# Patient Record
Sex: MALE | Race: OTHER | Employment: UNEMPLOYED | URBAN - METROPOLITAN AREA
[De-identification: names, ages, dates, MRNs, and addresses within clinical notes are randomized per-mention and may not be internally consistent; named-entity substitution may affect disease eponyms.]

---

## 2017-09-08 RX ORDER — PHENOLPHTHALEIN 90 MG
10 TABLET,CHEWABLE ORAL DAILY
COMMUNITY

## 2017-09-08 RX ORDER — GABAPENTIN 250 MG/5ML
250 SOLUTION ORAL 2 TIMES DAILY
COMMUNITY

## 2017-09-11 ENCOUNTER — ANESTHESIA (OUTPATIENT)
Dept: SURGERY | Age: 22
End: 2017-09-11
Payer: MEDICAID

## 2017-09-11 ENCOUNTER — HOSPITAL ENCOUNTER (OUTPATIENT)
Age: 22
Setting detail: OUTPATIENT SURGERY
Discharge: HOME | End: 2017-09-11
Attending: SURGERY | Admitting: SURGERY
Payer: MEDICAID

## 2017-09-11 ENCOUNTER — ANESTHESIA EVENT (OUTPATIENT)
Dept: SURGERY | Age: 22
End: 2017-09-11
Payer: MEDICAID

## 2017-09-11 VITALS
WEIGHT: 84.22 LBS | OXYGEN SATURATION: 99 % | TEMPERATURE: 97.5 F | HEART RATE: 109 BPM | HEIGHT: 60 IN | RESPIRATION RATE: 17 BRPM | SYSTOLIC BLOOD PRESSURE: 106 MMHG | DIASTOLIC BLOOD PRESSURE: 64 MMHG | BODY MASS INDEX: 16.53 KG/M2

## 2017-09-11 PROCEDURE — B4088 GASTRO/JEJUNO TUBE, LOW-PRO: HCPCS | Performed by: SURGERY

## 2017-09-11 PROCEDURE — 77030020782 HC GWN BAIR PAWS FLX 3M -B

## 2017-09-11 PROCEDURE — 77030005537 HC CATH URETH BARD -A: Performed by: SURGERY

## 2017-09-11 PROCEDURE — 77030011283 HC ELECTRD NDL COVD -A: Performed by: SURGERY

## 2017-09-11 PROCEDURE — 76210000020 HC REC RM PH II FIRST 0.5 HR: Performed by: SURGERY

## 2017-09-11 PROCEDURE — 74011250636 HC RX REV CODE- 250/636

## 2017-09-11 PROCEDURE — 77030008598 HC TRCR ENDOSC BLDLS J&J -B: Performed by: SURGERY

## 2017-09-11 PROCEDURE — 77030035048 HC TRCR ENDOSC OPTCL COVD -B: Performed by: SURGERY

## 2017-09-11 PROCEDURE — 77030010507 HC ADH SKN DERMBND J&J -B: Performed by: SURGERY

## 2017-09-11 PROCEDURE — 76210000063 HC OR PH I REC FIRST 0.5 HR: Performed by: SURGERY

## 2017-09-11 PROCEDURE — 77030031139 HC SUT VCRL2 J&J -A: Performed by: SURGERY

## 2017-09-11 PROCEDURE — 77030011640 HC PAD GRND REM COVD -A: Performed by: SURGERY

## 2017-09-11 PROCEDURE — 74011000250 HC RX REV CODE- 250: Performed by: SURGERY

## 2017-09-11 PROCEDURE — 76060000034 HC ANESTHESIA 1.5 TO 2 HR: Performed by: SURGERY

## 2017-09-11 PROCEDURE — 76010000153 HC OR TIME 1.5 TO 2 HR: Performed by: SURGERY

## 2017-09-11 PROCEDURE — 77030013079 HC BLNKT BAIR HGGR 3M -A: Performed by: ANESTHESIOLOGY

## 2017-09-11 PROCEDURE — 74011000250 HC RX REV CODE- 250

## 2017-09-11 PROCEDURE — 77030008684 HC TU ET CUF COVD -B: Performed by: ANESTHESIOLOGY

## 2017-09-11 PROCEDURE — 77030002933 HC SUT MCRYL J&J -A: Performed by: SURGERY

## 2017-09-11 PROCEDURE — 77030018836 HC SOL IRR NACL ICUM -A: Performed by: SURGERY

## 2017-09-11 PROCEDURE — 77030020747 HC TU INSUF ENDOSC TELE -A: Performed by: SURGERY

## 2017-09-11 PROCEDURE — 77030008477 HC STYL SATN SLP COVD -A: Performed by: ANESTHESIOLOGY

## 2017-09-11 RX ORDER — CEFAZOLIN SODIUM 1 G/3ML
INJECTION, POWDER, FOR SOLUTION INTRAMUSCULAR; INTRAVENOUS AS NEEDED
Status: DISCONTINUED | OUTPATIENT
Start: 2017-09-11 | End: 2017-09-11 | Stop reason: HOSPADM

## 2017-09-11 RX ORDER — SODIUM CHLORIDE, SODIUM LACTATE, POTASSIUM CHLORIDE, CALCIUM CHLORIDE 600; 310; 30; 20 MG/100ML; MG/100ML; MG/100ML; MG/100ML
INJECTION, SOLUTION INTRAVENOUS
Status: DISCONTINUED | OUTPATIENT
Start: 2017-09-11 | End: 2017-09-11 | Stop reason: HOSPADM

## 2017-09-11 RX ORDER — KETOROLAC TROMETHAMINE 30 MG/ML
INJECTION, SOLUTION INTRAMUSCULAR; INTRAVENOUS AS NEEDED
Status: DISCONTINUED | OUTPATIENT
Start: 2017-09-11 | End: 2017-09-11 | Stop reason: HOSPADM

## 2017-09-11 RX ORDER — ESMOLOL HYDROCHLORIDE 10 MG/ML
INJECTION INTRAVENOUS AS NEEDED
Status: DISCONTINUED | OUTPATIENT
Start: 2017-09-11 | End: 2017-09-11 | Stop reason: HOSPADM

## 2017-09-11 RX ORDER — GLYCOPYRROLATE 0.2 MG/ML
INJECTION INTRAMUSCULAR; INTRAVENOUS AS NEEDED
Status: DISCONTINUED | OUTPATIENT
Start: 2017-09-11 | End: 2017-09-11 | Stop reason: HOSPADM

## 2017-09-11 RX ORDER — LIDOCAINE HYDROCHLORIDE 20 MG/ML
INJECTION, SOLUTION EPIDURAL; INFILTRATION; INTRACAUDAL; PERINEURAL AS NEEDED
Status: DISCONTINUED | OUTPATIENT
Start: 2017-09-11 | End: 2017-09-11 | Stop reason: HOSPADM

## 2017-09-11 RX ORDER — ROCURONIUM BROMIDE 10 MG/ML
INJECTION, SOLUTION INTRAVENOUS AS NEEDED
Status: DISCONTINUED | OUTPATIENT
Start: 2017-09-11 | End: 2017-09-11 | Stop reason: HOSPADM

## 2017-09-11 RX ORDER — BUPIVACAINE HYDROCHLORIDE 2.5 MG/ML
INJECTION, SOLUTION EPIDURAL; INFILTRATION; INTRACAUDAL AS NEEDED
Status: DISCONTINUED | OUTPATIENT
Start: 2017-09-11 | End: 2017-09-11 | Stop reason: HOSPADM

## 2017-09-11 RX ORDER — PHENYLEPHRINE HCL IN 0.9% NACL 0.4MG/10ML
SYRINGE (ML) INTRAVENOUS AS NEEDED
Status: DISCONTINUED | OUTPATIENT
Start: 2017-09-11 | End: 2017-09-11 | Stop reason: HOSPADM

## 2017-09-11 RX ORDER — ACETAMINOPHEN 10 MG/ML
INJECTION, SOLUTION INTRAVENOUS AS NEEDED
Status: DISCONTINUED | OUTPATIENT
Start: 2017-09-11 | End: 2017-09-11 | Stop reason: HOSPADM

## 2017-09-11 RX ORDER — PROPOFOL 10 MG/ML
INJECTION, EMULSION INTRAVENOUS AS NEEDED
Status: DISCONTINUED | OUTPATIENT
Start: 2017-09-11 | End: 2017-09-11 | Stop reason: HOSPADM

## 2017-09-11 RX ORDER — ONDANSETRON 2 MG/ML
INJECTION INTRAMUSCULAR; INTRAVENOUS AS NEEDED
Status: DISCONTINUED | OUTPATIENT
Start: 2017-09-11 | End: 2017-09-11 | Stop reason: HOSPADM

## 2017-09-11 RX ORDER — FENTANYL CITRATE 50 UG/ML
INJECTION, SOLUTION INTRAMUSCULAR; INTRAVENOUS AS NEEDED
Status: DISCONTINUED | OUTPATIENT
Start: 2017-09-11 | End: 2017-09-11 | Stop reason: HOSPADM

## 2017-09-11 RX ORDER — WATER FOR INJECTION,STERILE
VIAL (ML) INJECTION AS NEEDED
Status: DISCONTINUED | OUTPATIENT
Start: 2017-09-11 | End: 2017-09-11 | Stop reason: HOSPADM

## 2017-09-11 RX ADMIN — ROCURONIUM BROMIDE 30 MG: 10 INJECTION, SOLUTION INTRAVENOUS at 11:07

## 2017-09-11 RX ADMIN — GLYCOPYRROLATE 0.2 MG: 0.2 INJECTION INTRAMUSCULAR; INTRAVENOUS at 11:48

## 2017-09-11 RX ADMIN — ACETAMINOPHEN 573 MG: 10 INJECTION, SOLUTION INTRAVENOUS at 11:43

## 2017-09-11 RX ADMIN — GLYCOPYRROLATE 0.2 MG: 0.2 INJECTION INTRAMUSCULAR; INTRAVENOUS at 11:53

## 2017-09-11 RX ADMIN — LIDOCAINE HYDROCHLORIDE 40 MG: 20 INJECTION, SOLUTION EPIDURAL; INFILTRATION; INTRACAUDAL; PERINEURAL at 11:07

## 2017-09-11 RX ADMIN — ONDANSETRON 4 MG: 2 INJECTION INTRAMUSCULAR; INTRAVENOUS at 12:18

## 2017-09-11 RX ADMIN — ESMOLOL HYDROCHLORIDE 10 MG: 10 INJECTION INTRAVENOUS at 12:01

## 2017-09-11 RX ADMIN — KETOROLAC TROMETHAMINE 15 MG: 30 INJECTION, SOLUTION INTRAMUSCULAR; INTRAVENOUS at 12:24

## 2017-09-11 RX ADMIN — FENTANYL CITRATE 25 MCG: 50 INJECTION, SOLUTION INTRAMUSCULAR; INTRAVENOUS at 11:56

## 2017-09-11 RX ADMIN — ESMOLOL HYDROCHLORIDE 15 MG: 10 INJECTION INTRAVENOUS at 12:03

## 2017-09-11 RX ADMIN — CEFAZOLIN SODIUM 0.95 G: 1 INJECTION, POWDER, FOR SOLUTION INTRAMUSCULAR; INTRAVENOUS at 11:31

## 2017-09-11 RX ADMIN — PROPOFOL 60 MG: 10 INJECTION, EMULSION INTRAVENOUS at 11:07

## 2017-09-11 RX ADMIN — Medication 80 MCG: at 11:48

## 2017-09-11 RX ADMIN — SODIUM CHLORIDE, SODIUM LACTATE, POTASSIUM CHLORIDE, CALCIUM CHLORIDE: 600; 310; 30; 20 INJECTION, SOLUTION INTRAVENOUS at 11:00

## 2017-09-11 RX ADMIN — ROCURONIUM BROMIDE 10 MG: 10 INJECTION, SOLUTION INTRAVENOUS at 11:48

## 2017-09-11 RX ADMIN — FENTANYL CITRATE 25 MCG: 50 INJECTION, SOLUTION INTRAMUSCULAR; INTRAVENOUS at 11:41

## 2017-09-11 RX ADMIN — ESMOLOL HYDROCHLORIDE 10 MG: 10 INJECTION INTRAVENOUS at 11:58

## 2017-09-11 NOTE — ANESTHESIA POSTPROCEDURE EVALUATION
Post-Anesthesia Evaluation and Assessment    Patient: Zain Lo MRN: [id number]  SSN: [id number]    YOB: 1995  Age: 22 y.o. Sex: male       Cardiovascular Function/Vital Signs  Visit Vitals    /72 (BP 1 Location: Right leg, BP Patient Position: At rest)    Pulse (!) 115    Temp 36.4 °C (97.5 °F)    Resp 21    Ht 4' 5\" (1.346 m)    Wt 38.2 kg (84 lb 3.5 oz)    SpO2 94%    BMI 21.08 kg/m2       Patient is status post general anesthesia for Procedure(s):  DIAGNOSTIC  LAPAROSCOPY REPLACEMENT OF GASTROSTOMY TUBE. Nausea/Vomiting: None    Postoperative hydration reviewed and adequate. Pain:  Pain Scale 1: FACES (09/11/17 1245)   Managed    Neurological Status:   Neuro (WDL): Exceptions to WDL (09/11/17 1245)  Neuro  Neurologic State: Drowsy (09/11/17 1245)   At baseline    Mental Status and Level of Consciousness: Arousable    Pulmonary Status:   O2 Device: Room air (09/11/17 1245)   Adequate oxygenation and airway patent    Complications related to anesthesia: None    Post-anesthesia assessment completed.  No concerns    Signed By: Chad Castillo MD     September 11, 2017

## 2017-09-11 NOTE — ANESTHESIA PREPROCEDURE EVALUATION
Anesthetic History   No history of anesthetic complications            Review of Systems / Medical History  Patient summary reviewed, nursing notes reviewed and pertinent labs reviewed    Pulmonary  Within defined limits                 Neuro/Psych   Within defined limits  seizures         Cardiovascular  Within defined limits                     GI/Hepatic/Renal  Within defined limits   GERD           Endo/Other  Within defined limits      Arthritis     Other Findings   Comments: CP           Physical Exam    Airway  Mallampati: II  TM Distance: > 6 cm  Neck ROM: normal range of motion   Mouth opening: Normal     Cardiovascular  Regular rate and rhythm,  S1 and S2 normal,  no murmur, click, rub, or gallop             Dental  No notable dental hx       Pulmonary  Breath sounds clear to auscultation               Abdominal  GI exam deferred       Other Findings            Anesthetic Plan    ASA: 3  Anesthesia type: general          Induction: Intravenous and Inhalational  Anesthetic plan and risks discussed with: Patient and Parent / [address]

## 2017-09-11 NOTE — DISCHARGE INSTRUCTIONS
Laparoscopy: What to Expect at [address] [place] [address]  After laparoscopic surgery, you are likely to have pain for the next several days. You may also feel like you have the flu. You may have a low fever and feel tired and sick to your stomach. This is common. You should feel better after 1 to 2 weeks. This care sheet gives you a general idea about how long it will take for you to recover. But each person recovers at a different pace. Follow the steps below to get better as quickly as possible. How can you care for yourself at home? Activity  · You may also have pain in your shoulder. The pain usually lasts about 1 or 2 days. · You may shower/bathe 24 to 48 hours after surgery, if your doctor okays it. Pat the cut (incision) dry. Do not take a bath for the first 2 weeks, or until your doctor tells you it is okay. Diet  · If your stomach is upset, try bland, low-fat foods such as plain rice, broiled chicken, toast, and yogurt. · Drink plenty of fluids (enough so that your urine is light yellow or clear like water) to prevent dehydration. Choose water and other caffeine-free clear liquids. If you have kidney, heart, or liver disease and have to limit fluids, talk with your doctor before you increase the amount of fluids you drink. · You may notice that your bowel movements are not regular right after your surgery. This is common. Avoid constipation and straining with bowel movements. You may want to take a fiber supplement every day. If you have not had a bowel movement after a couple of days, ask your doctor about taking a mild laxative. Medicines  · Your doctor will tell you if and when you can restart your medicines. He or she will also give you instructions about taking any new medicines. · If you take blood thinners, such as warfarin (Coumadin), clopidogrel (Plavix), or aspirin, be sure to talk to your doctor. He or she will tell you if and when to start taking those medicines again.  Make sure that you understand exactly what your doctor wants you to do. · Take pain medicines exactly as directed. ¨ If the doctor gave you a prescription medicine for pain, take it as prescribed. ¨ If you are not taking a prescription pain medicine, ask your doctor if you can take an over-the-counter medicine. · If your doctor prescribed antibiotics, take them as directed. Do not stop taking them just because you feel better. You need to take the full course of antibiotics. · If you think your pain medicine is making you sick to your stomach:  ¨ Take your medicine after meals (unless your doctor has told you not to). ¨ Ask your doctor for a different pain medicine. Incision care  · You have surgical glue on the incision, leave this on until it falls off. · Wash the area daily with warm, soapy water and pat it dry. Don't use hydrogen peroxide or alcohol, which can slow healing. You may cover the area with a gauze bandage if it weeps or rubs against clothing. Change the bandage every day. Follow-up care is a key part of your treatment and safety. Be sure to make and go to all appointments, and call your doctor if you are having problems. It's also a good idea to know your test results and keep a list of the medicines you take. When should you call for help? Call 911 anytime you think you may need emergency care. For example, call if:  · You passed out (lost consciousness). · You have sudden chest pain and shortness of breath, or you cough up blood. · You have severe pain in your belly. Call your doctor now or seek immediate medical care if:  · You have pain that does not get better after you take pain medicine. · You have loose stitches, or your incision comes open. · You are bleeding or have new drainage from the incision. · You develop a hard lump at the incision. · You have signs of infection, such as:  ¨ Increased pain, swelling, warmth, or redness. ¨ Red streaks leading from the incision.   ¨ Pus draining from the incision. ¨ A fever. Watch closely for any changes in your health, and be sure to contact your doctor if:  · You do not have a bowel movement after taking a laxative. · You do not get better as expected. Where can you learn more? Go to http://josé antonio-katie.info/. Enter G997 in the search box to learn more about \"Laparoscopy: What to Expect at Home. \"  Current as of: August 9, 2016  Content Version: 11.3  © 8518-8473 [employer]. Care instructions adapted under license by [employer] (which disclaims liability or warranty for this information). If you have questions about a medical condition or this instruction, always ask your healthcare professional. [address] any warranty or liability for your use of this information. ______________________________________________________________________    Anesthesia Discharge Instructions    After general anesthesia or intervenous sedation, for 24 hours or while taking prescription Narcotics:  · Limit your activities  · Do not drive or operate hazardous machinery  · If you have not urinated within 8 hours after discharge, please contact your surgeon on call. · Do not make important personal or business decisions  · Do not drink alcoholic beverages    Report the following to your surgeon:  · Excessive pain, swelling, redness or odor of or around the surgical area  · Temperature over 100.5 degrees  · Nausea and vomiting lasting longer than 4 hours or if unable to take medication  · Any signs of decreased circulation or nerve impairment to extremity:  Change in color, persistent numbness, tingling, coldness or increased pain.   · Any questions

## 2017-09-11 NOTE — OP NOTES
[address] [facility] [address] [place] [address], [address]   OP NOTE       Name:  Aparna Dallas   MR#:  [id number]   :  1995   Account #:  [de-identified]    Surgery Date:  2017   Date of Adm:  2017       PREOPERATIVE DIAGNOSIS:  Malfunction of gastrostomy tube. POSTOPERATIVE DIAGNOSIS:  Malfunction of gastrostomy tube. PROCEDURES PERFORMED:  Exploratory laparoscopy and   replacement of gastrostomy tube. SURGEON: Aston Adler MD    ASSISTANT: Plant     ANESTHESIA:  General    HISTORY: This is a 27-year-old born with cerebral palsy, requiring   gastrostomy and Nissen, and now having difficulty in replacing the tube   of his gastrostomy tube. He is being taken to the operating room for   exploratory laparoscopy and possible replacement or repositioning of   his gastrostomy tube. DESCRIPTION OF PROCEDURE: The patient was in the supine   position, general anesthesia with endotracheal intubation. The   abdomen was prepped with Betadine soap solution. Sterile drapes   were placed. We approached with a 5 Covidien port at the umbilicus   and exam of the abdominal cavity showed multiple adhesions,   probably secondary to his  shunt and a very large distended colon. We placed a second port in the right upper quadrant and slowly, we   could identify where the stomach was and it showed to be  in a   good position with no L type shape angle. During the procedure, he dropped  his blood pressure slightly that responded by decreasing the insuflation pressure. We went ahead and passed a Amezcua easily went into the stomach without   any difficulty, so we opted then to try a new mini gastrostomy tube. We   used the [address] 4 cm   and after dilating with Hegar, we were able to easily pass into the stomach. Balloon was inflated with 5 ml of water. The wounds where closed with 5-0 Monocryl and Dermabond glue. The   wound was infiltrated with Marcaine 0.25% plain.  Instrument, gauze and needle counts were correct at the end of the case. BLOOD LOSS/BLEEDING: Minimal.     SPECIMEN: None. COMPLICATIONS: None.         Elijah Hernández MD      CO / [facility]   D:  09/11/2017   12:29   T:  09/11/2017   13:28   Job #:  [id number]

## 2017-09-11 NOTE — BRIEF OP NOTE
BRIEF OPERATIVE NOTE    Date of Procedure: 9/11/2017   Preoperative Diagnosis: FEEDING DIFFICULTIES  Postoperative Diagnosis: FEEDING DIFFICULTIES    Procedure(s):  DIAGNOSTIC  LAPAROSCOPY REPLACEMENT OF GASTROSTOMY TUBE  Surgeon(s) and Role:     * Cynthia George MD - Primary         Assistant Staff:       Surgical Staff:  Circ-1: Catrachito Hodge RN  Circ-Relief: Claudell Gazella, RN  Scrub RN-1: Estella Bains RN  Event Time In   Incision Start 1140   Incision Close      Anesthesia: General   Estimated Blood Loss: minimal  Specimens: * No specimens in log *   Findings: Large amount of adhesions, very large distended colon, easy passage of GT tube   Complications: none  Implants: * No implants in log *

## 2017-09-11 NOTE — IP AVS SNAPSHOT
2700 [facility] [address] [place] [address] 
[phone] Patient: Ketty Hall MRN: [id number] :1995 You are allergic to the following No active allergies Recent Documentation Height Weight BMI Smoking Status 1.346 m 38.2 kg 21.08 kg/m2 Never Smoker Emergency Contacts Name Discharge Info Relation Home Work Mobile Kalpana Membreno CAREGIVER [3] Mother [14] [phone] About your hospitalization You were admitted on:  2017 [facility] on:  2017 Unit phone number:  [phone] Why you were hospitalized Your primary diagnosis was:  Not on File Providers Seen During Your Hospitalizations Provider Role Specialty Primary office phone Brooke Kong MD Attending Provider Pediatric Surgery [phone] Your Primary Care Physician (PCP) Primary Care Physician Office Phone Office Fax Graciela [place] [phone] Follow-up Information Follow up With Details Comments Contact Info [facility]a Thompson MD   [facility] 
[phone] Brooke Kong MD Follow up in 1 month(s) call the office to schedule a follow up visit [id number] [place] [address] 
[phone] Current Discharge Medication List  
  
CONTINUE these medications which have CHANGED Dose & Instructions Dispensing Information Comments Morning Noon Evening Bedtime * raNITIdine 15 mg/mL syrup Commonly known as:  ZANTAC What changed:  Another medication with the same name was changed. Make sure you understand how and when to take each. Your last dose was: Your next dose is: TAKE 5 ML BY MOUTH TWO (2) TIMES A DAY FOR 30 DAYS AS NEEDED FOR REFLUX,COUGH Quantity:  300 mL Refills:  5 * raNITIdine 15 mg/mL syrup Commonly known as:  ZANTAC What changed:  See the new instructions. Your last dose was: Your next dose is: TAKE 5 ML BY MOUTH TWO (2) TIMES A DAY FOR 30 DAYS AS NEEDED FOR REFLUX,COUGH Quantity:  300 mL Refills:  5  
     
   
   
   
  
 * Notice: This list has 2 medication(s) that are the same as other medications prescribed for you. Read the directions carefully, and ask your doctor or other care provider to review them with you. CONTINUE these medications which have NOT CHANGED Dose & Instructions Dispensing Information Comments Morning Noon Evening Bedtime  
 gabapentin 250 mg/5 mL solution Commonly known as:  NEURONTIN Your last dose was: Your next dose is:    
   
   
 Dose:  250 mg Take 250 mg by mouth two (2) times a day. Refills:  0 GENTLE LAXATIVE 10 mg suppository Generic drug:  bisacodyl Your last dose was: Your next dose is: UNWRAP AND INSERT 1 SUPPOSITORY INTO RECTUM EVERY OTHER DAY. Quantity:  15 Suppository Refills:  6  
     
   
   
   
  
 loratadine 5 mg/5 mL syrup Commonly known as:  Jesse Pour Your last dose was: Your next dose is:    
   
   
 Dose:  10 mg Take 10 mg by mouth daily. Refills:  0  
     
   
   
   
  
 mupirocin 2 % ointment Commonly known as:  [facility] Your last dose was: Your next dose is:    
   
   
 Apply  to affected area two (2) times a day. Apply for 3 weeks after cleansing gastrostomy site with soap and water Quantity:  22 g Refills:  1  
     
   
   
   
  
 valproic acid (as sodium salt) 250 mg/5 mL Syrg Your last dose was: Your next dose is:    
   
   
 Dose:  8 mL Take 8 mL by mouth two (2) times a day. Refills:  0 Discharge Instructions Laparoscopy: What to Expect at [facility] Your Recovery After laparoscopic surgery, you are likely to have pain for the next several days. You may also feel like you have the flu. You may have a low fever and feel tired and sick to your stomach. This is common. You should feel better after 1 to 2 weeks. This care sheet gives you a general idea about how long it will take for you to recover. But each person recovers at a different pace. Follow the steps below to get better as quickly as possible. How can you care for yourself at home? Activity · You may also have pain in your shoulder. The pain usually lasts about 1 or 2 days. · You may shower/bathe 24 to 48 hours after surgery, if your doctor okays it. Pat the cut (incision) dry. Do not take a bath for the first 2 weeks, or until your doctor tells you it is okay. Diet · If your stomach is upset, try bland, low-fat foods such as plain rice, broiled chicken, toast, and yogurt. · Drink plenty of fluids (enough so that your urine is light yellow or clear like water) to prevent dehydration. Choose water and other caffeine-free clear liquids. If you have kidney, heart, or liver disease and have to limit fluids, talk with your doctor before you increase the amount of fluids you drink. · You may notice that your bowel movements are not regular right after your surgery. This is common. Avoid constipation and straining with bowel movements. You may want to take a fiber supplement every day. If you have not had a bowel movement after a couple of days, ask your doctor about taking a mild laxative. Medicines · Your doctor will tell you if and when you can restart your medicines. He or she will also give you instructions about taking any new medicines. · If you take blood thinners, such as warfarin (Coumadin), clopidogrel (Plavix), or aspirin, be sure to talk to your doctor. He or she will tell you if and when to start taking those medicines again. Make sure that you understand exactly what your doctor wants you to do. · Take pain medicines exactly as directed. ¨ If the doctor gave you a prescription medicine for pain, take it as prescribed. ¨ If you are not taking a prescription pain medicine, ask your doctor if you can take an over-the-counter medicine. · If your doctor prescribed antibiotics, take them as directed. Do not stop taking them just because you feel better. You need to take the full course of antibiotics. · If you think your pain medicine is making you sick to your stomach: 
¨ Take your medicine after meals (unless your doctor has told you not to). ¨ Ask your doctor for a different pain medicine. Incision care · You have surgical glue on the incision, leave this on until it falls off. · Wash the area daily with warm, soapy water and pat it dry. Don't use hydrogen peroxide or alcohol, which can slow healing. You may cover the area with a gauze bandage if it weeps or rubs against clothing. Change the bandage every day. Follow-up care is a key part of your treatment and safety. Be sure to make and go to all appointments, and call your doctor if you are having problems. It's also a good idea to know your test results and keep a list of the medicines you take. When should you call for help? Call 911 anytime you think you may need emergency care. For example, call if: 
· You passed out (lost consciousness). · You have sudden chest pain and shortness of breath, or you cough up blood. · You have severe pain in your belly. Call your doctor now or seek immediate medical care if: 
· You have pain that does not get better after you take pain medicine. · You have loose stitches, or your incision comes open. · You are bleeding or have new drainage from the incision. · You develop a hard lump at the incision. · You have signs of infection, such as: 
¨ Increased pain, swelling, warmth, or redness. ¨ Red streaks leading from the incision. ¨ Pus draining from the incision. ¨ A fever. Watch closely for any changes in your health, and be sure to contact your doctor if: 
· You do not have a bowel movement after taking a laxative. · You do not get better as expected. Where can you learn more? Go to http://josé antonio-katie.info/. Enter H954 in the search box to learn more about \"Laparoscopy: What to Expect at Home. \" Current as of: August 9, 2016 Content Version: 11.3 © 9742-5714 [employer]. Care instructions adapted under license by [employer] (which disclaims liability or warranty for this information). If you have questions about a medical condition or this instruction, always ask your healthcare professional. [address] any warranty or liability for your use of this information. ______________________________________________________________________ Anesthesia Discharge Instructions After general anesthesia or intervenous sedation, for 24 hours or while taking prescription Narcotics: · Limit your activities · Do not drive or operate hazardous machinery · If you have not urinated within 8 hours after discharge, please contact your surgeon on call. · Do not make important personal or business decisions · Do not drink alcoholic beverages Report the following to your surgeon: 
· Excessive pain, swelling, redness or odor of or around the surgical area · Temperature over 100.5 degrees · Nausea and vomiting lasting longer than 4 hours or if unable to take medication · Any signs of decreased circulation or nerve impairment to extremity:  Change in color, persistent numbness, tingling, coldness or increased pain. · Any questions Discharge Orders None Introducing [facility]! Francesca Pina introduces vIPtela patient portal. Now you can access parts of your medical record, email your doctor's office, and request medication refills online.    
 
1. In your internet browser, go to [email or web address] 2. Click on the First Time User? Click Here link in the Sign In box. You will see the New Member Sign Up page. 3. Enter your FirstRide Access Code exactly as it appears below. You will not need to use this code after youve completed the sign-up process. If you do not sign up before the expiration date, you must request a new code. · FirstRide Access Code: [email or web address] Expires: 12/7/2017  4:44 PM 
 
4. Enter the last four digits of your Social Security Number (xxxx) and Date of Birth (mm/dd/yyyy) as indicated and click Submit. You will be taken to the next sign-up page. 5. Create a FirstRide ID. This will be your FirstRide login ID and cannot be changed, so think of one that is secure and easy to remember. 6. Create a FirstRide password. You can change your password at any time. 7. Enter your Password Reset Question and Answer. This can be used at a later time if you forget your password. 8. Enter your e-mail address. You will receive e-mail notification when new information is available in [address] 9. Click Sign Up. You can now view and download portions of your medical record. 10. Click the Download Summary menu link to download a portable copy of your medical information. If you have questions, please visit the Frequently Asked Questions section of the FirstRide website. Remember, FirstRide is NOT to be used for urgent needs. For medical emergencies, dial 911. Now available from your iPhone and Android! General Information Please provide this summary of care documentation to your next provider. Patient Signature:  ____________________________________________________________ Date:  ____________________________________________________________  
  
Larri Hazard Provider Signature:  ____________________________________________________________ Date:  ____________________________________________________________ 2700 [facility] [address] [place] [address] 
[phone] Patient: Merline Cords MRN: [id number] :1995 Tiene alergias a lo siguiente No tiene alergias Documentación recientes Height Weight BMI (IMC) Estatus de tabaquísmo 1.346 m 38.2 kg 21.08 kg/m2 Never Smoker Emergency Contacts Name Discharge Info Relation Home Work Mobile Gianni Hurt CAREGIVER [3] Mother [14] [phone] Sobre loving hospitalización Le admitieron el:  2017 Loving tratamiento más reciente [facility] [place]:  [facility] PACU Le dieron de hira el:  2017 Número de teléfono de la unidad:  [phone] Por qué le ingresaron Loving diagnosis primaria es:  No está archivado/a Proveedores de verse rosetta annette hospitalizaciones Personal Médico Rol Especialidad Teléfono principal de la oficina Rubin Leonardo MD Attending Provider Pediatric Surgery [phone] Loving médico de atención primaria (PCP ) Primary Care Physician Office Phone Office Fax Rachel Somers [phone] Follow-up Information Follow up With Details Comments Contact Info Yudith Navarro MD   [address]  
[phone] Rubin Leonardo MD Follow up in 1 month(s) call the office to schedule a follow up visit [id number] [address] [address] [place] [address] 
[phone] Aprobación de la gestión actual lista de medicamentos CONTINUAR estos medicamentos que [place] Dosis e instrucciones Información de dispensación Comentarios Morning Noon Evening Bedtime * raNITIdine 15 mg/mL syrup También conocido nima:  ZANTAC Lo que cambió:  Se modificó otro medicamento con el mismo nombre. Asegúrese de que entiende cómo y cuándo natalia cada jay. Your last dose was: Your next dose is: TAKE 5 ML BY MOUTH TWO (2) TIMES A DAY FOR 30 DAYS AS NEEDED FOR REFLUX,COUGH  
 cantidad:  300 mL  
recargas:  5  
     
   
   
   
  
 * raNITIdine 15 mg/mL syrup También conocido nima:  ZANTAC Lo que cambió:  Donaldo las nuevas instrucciones. Your last dose was: Your next dose is: TAKE 5 ML BY MOUTH TWO (2) TIMES A DAY FOR 30 DAYS AS NEEDED FOR REFLUX,COUGH  
 cantidad:  300 mL  
recargas:  5  
     
   
   
   
  
 * Aviso:  Esta lista contiene medicamentos que son iguales a otros medicamentos recetados para usted. Ludy las instrucciones con cuidado y pida a guaman personal médico que revise la lista de medicamentos y las instrucciones correspondientes con usted. CONTINUAR estos medicamentos que no [place] Dosis e instrucciones Información de dispensación Comentarios Morning Noon Evening Bedtime  
 gabapentin 250 mg/5 mL solution También conocido nima:  NEURONTIN Your last dose was: Your next dose is:    
   
   
 Dosis:  250 mg Take 250 mg by mouth two (2) times a day. recargas:  0 GENTLE LAXATIVE 10 mg suppository Medicamento genérico:  bisacodyl Your last dose was: Your next dose is: UNWRAP AND INSERT 1 SUPPOSITORY INTO RECTUM EVERY OTHER DAY. cantidad:  15 Suppository  
recargas:  6  
     
   
   
   
  
 loratadine 5 mg/5 mL syrup También conocido nima:  Rhonda Ford Your last dose was: Your next dose is:    
   
   
 Dosis:  10 mg Take 10 mg by mouth daily. recargas:  0  
     
   
   
   
  
 mupirocin 2 % ointment También conocido nima:  [facility] Your last dose was: Your next dose is:    
   
   
 Apply  to affected area two (2) times a day. Apply for 3 weeks after cleansing gastrostomy site with soap and water  
 cantidad:  22 g  
recargas:  1  
     
   
   
   
  
 valproic acid (as sodium salt) 250 mg/5 mL Syrg Your last dose was: Your next dose is:    
   
   
 Dosis:  8 mL Take 8 mL by mouth two (2) times a day. recargas:  0 Discharge Instructions Laparoscopy: What to Expect at [facility] Your Recovery After laparoscopic surgery, you are likely to have pain for the next several days. You may also feel like you have the flu. You may have a low fever and feel tired and sick to your stomach. This is common. You should feel better after 1 to 2 weeks. This care sheet gives you a general idea about how long it will take for you to recover. But each person recovers at a different pace. Follow the steps below to get better as quickly as possible. How can you care for yourself at home? Activity · You may also have pain in your shoulder. The pain usually lasts about 1 or 2 days. · You may shower/bathe 24 to 48 hours after surgery, if your doctor okays it. Pat the cut (incision) dry. Do not take a bath for the first 2 weeks, or until your doctor tells you it is okay. Diet · If your stomach is upset, try bland, low-fat foods such as plain rice, broiled chicken, toast, and yogurt. · Drink plenty of fluids (enough so that your urine is light yellow or clear like water) to prevent dehydration. Choose water and other caffeine-free clear liquids. If you have kidney, heart, or liver disease and have to limit fluids, talk with your doctor before you increase the amount of fluids you drink. · You may notice that your bowel movements are not regular right after your surgery. This is common. Avoid constipation and straining with bowel movements. You may want to take a fiber supplement every day. If you have not had a bowel movement after a couple of days, ask your doctor about taking a mild laxative. Medicines · Your doctor will tell you if and when you can restart your medicines. He or she will also give you instructions about taking any new medicines. · If you take blood thinners, such as warfarin (Coumadin), clopidogrel (Plavix), or aspirin, be sure to talk to your doctor. He or she will tell you if and when to start taking those medicines again. Make sure that you understand exactly what your doctor wants you to do. · Take pain medicines exactly as directed. ¨ If the doctor gave you a prescription medicine for pain, take it as prescribed. ¨ If you are not taking a prescription pain medicine, ask your doctor if you can take an over-the-counter medicine. · If your doctor prescribed antibiotics, take them as directed. Do not stop taking them just because you feel better. You need to take the full course of antibiotics. · If you think your pain medicine is making you sick to your stomach: 
¨ Take your medicine after meals (unless your doctor has told you not to). ¨ Ask your doctor for a different pain medicine. Incision care · You have surgical glue on the incision, leave this on until it falls off. · Wash the area daily with warm, soapy water and pat it dry. Don't use hydrogen peroxide or alcohol, which can slow healing. You may cover the area with a gauze bandage if it weeps or rubs against clothing. Change the bandage every day. Follow-up care is a key part of your treatment and safety. Be sure to make and go to all appointments, and call your doctor if you are having problems. It's also a good idea to know your test results and keep a list of the medicines you take. When should you call for help? Call 911 anytime you think you may need emergency care. For example, call if: 
· You passed out (lost consciousness). · You have sudden chest pain and shortness of breath, or you cough up blood. · You have severe pain in your belly. Call your doctor now or seek immediate medical care if: 
· You have pain that does not get better after you take pain medicine. · You have loose stitches, or your incision comes open. · You are bleeding or have new drainage from the incision. · You develop a hard lump at the incision. · You have signs of infection, such as: 
¨ Increased pain, swelling, warmth, or redness. ¨ Red streaks leading from the incision. ¨ Pus draining from the incision. ¨ A fever. Watch closely for any changes in your health, and be sure to contact your doctor if: 
· You do not have a bowel movement after taking a laxative. · You do not get better as expected. Where can you learn more? Go to http://josé antonio-katie.info/. Enter O772 in the search box to learn more about \"Laparoscopy: What to Expect at Home. \" Current as of: August 9, 2016 Content Version: 11.3 © 6679-2897 [employer]. Care instructions adapted under license by [employer] (which disclaims liability or warranty for this information). If you have questions about a medical condition or this instruction, always ask your healthcare professional. [place] [address] any warranty or liability for your use of this information. ______________________________________________________________________ Anesthesia Discharge Instructions After general anesthesia or intervenous sedation, for 24 hours or while taking prescription Narcotics: · Limit your activities · Do not drive or operate hazardous machinery · If you have not urinated within 8 hours after discharge, please contact your surgeon on call. · Do not make important personal or business decisions · Do not drink alcoholic beverages Report the following to your surgeon: 
· Excessive pain, swelling, redness or odor of or around the surgical area · Temperature over 100.5 degrees · Nausea and vomiting lasting longer than 4 hours or if unable to take medication · Any signs of decreased circulation or nerve impairment to extremity:  Change in color, persistent numbness, tingling, coldness or increased pain. · Any questions Discharge Orders AwoX Introducing [facility]! Lillian Shaan introduces [employer] On[employer]ine patient portal. Now you can access parts of your medical record, email your doctor's office, and request medication refills online. 1. In your internet browser, go to [email or web address] 2. Click on the First Time User? Click Here link in the Sign In box. You will see the New Member Sign Up page. 3. Enter your Z[employer] On[employer]ine Access Code exactly as it appears below. You will not need to use this code after youve completed the sign-up process. If you do not sign up before the expiration date, you must request a new code. · [employer] Access Code: [email or web address] Expires: 12/7/2017  4:44 PM 
 
4. Enter the last four digits of your Social Security Number (xxxx) and Date of Birth (mm/dd/yyyy) as indicated and click Submit. You will be taken to the next sign-up page. 5. Create a Zuu Onlnine ID. This will be your Z[employer] On[employer]ine login ID and cannot be changed, so think of one that is secure and easy to remember. 6. Create a Zuu On[employer]ine password. You can change your password at any time. 7. Enter your Password Reset Question and Answer. This can be used at a later time if you forget your password. 8. Enter your e-mail address. You will receive e-mail notification when new information is available in [address] 9. Click Sign Up. You can now view and download portions of your medical record. 10. Click the Download Summary menu link to download a portable copy of your medical information. If you have questions, please visit the Frequently Asked Questions section of the Zuu On[employer]ine website. Remember, [employer] is NOT to be used for urgent needs. For medical emergencies, dial 911. Now available from your iPhone and Android! General Information Please provide this summary of care documentation to your next provider.  
  
  
    
    
 Patient Signature: ____________________________________________________________ Date:  ____________________________________________________________  
  
Ban Pane Provider Signature:  ____________________________________________________________ Date:  ____________________________________________________________

## 2017-09-11 NOTE — ROUTINE PROCESS
Patient: Ronaldo Alfred MRN: [id number]  SSN: [id number]   YOB: 1995  Age: 22 y.o. Sex: male     Patient is status post Procedure(s):  DIAGNOSTIC  LAPAROSCOPY REPLACEMENT OF GASTROSTOMY TUBE. Surgeon(s) and Role:     * Herlinda Barkre MD - Primary    Local/Dose/Irrigation:  5ML 0.25% MARCAINE                   Peripheral IV 04/26/12 Left Forearm (Active)       Peripheral IV 09/11/17 Right Hand (Active)          PEG/Gastrostomy Tube 09/11/17 (Active)                     Dressing/Packing:     DERMABOND TO UMILICUS AND RIGHT MID ABDOMEN TROCAR SITES.

## (undated) DEVICE — (D)SYR 10ML 1/5ML GRAD NSAF -- PKGING CHANGE USE ITEM 338027

## (undated) DEVICE — SOLUTION IV 1000ML 0.9% SOD CHL

## (undated) DEVICE — SUTURE MCRYL SZ 5-0 L27IN ABSRB UD L13MM TF 1/2 CIR Y433H

## (undated) DEVICE — SOL ANTI-FOG 6ML MEDC -- MEDICHOICE - CONVERT TO 358427

## (undated) DEVICE — TUBING INSUFLTN 10FT LUER -- CONVERT TO ITEM 368568

## (undated) DEVICE — INFECTION CONTROL KIT SYS

## (undated) DEVICE — SUTURE VCRL SZ 2-0 L27IN ABSRB VLT L26MM UR-6 5/8 CIR J602H

## (undated) DEVICE — Device

## (undated) DEVICE — KIT GASTROSTMY TB 14FR STOMA L1.5CM RECESS DST TIP SITS AT

## (undated) DEVICE — INTENDED FOR TISSUE SEPARATION, AND OTHER PROCEDURES THAT REQUIRE A SHARP SURGICAL BLADE TO PUNCTURE OR CUT.: Brand: BARD-PARKER ® CARBON RIB-BACK BLADES

## (undated) DEVICE — TRAY PREP DRY W/ PREM GLV 2 APPL 6 SPNG 2 UNDPD 1 OVERWRAP

## (undated) DEVICE — STERILE POLYISOPRENE POWDER-FREE SURGICAL GLOVES WITH EMOLLIENT COATING: Brand: PROTEXIS

## (undated) DEVICE — ELECTRODE NDL 2.8IN COAT VALLEYLAB

## (undated) DEVICE — HANDLE LT SNAP ON ULT DURABLE LENS FOR TRUMPF ALC DISPOSABLE

## (undated) DEVICE — CATHETER URETH 12FR BLLN 5CC SIL F INDWL 2 W SHT LEN STD

## (undated) DEVICE — DERMABOND SKIN ADH 0.7ML -- DERMABOND ADVANCED 12/BX

## (undated) DEVICE — 1010 S-DRAPE TOWEL DRAPE 10/BX: Brand: STERI-DRAPE™

## (undated) DEVICE — ASTOUND STANDARD SURGICAL GOWN, XL: Brand: CONVERTORS

## (undated) DEVICE — TRAP SUC MUCOUS 70ML -- MEDICHOICE MEDLINE

## (undated) DEVICE — TROCAR ENDOSCP BLDELSS 2-3MM [23NBS] [JNJ ETHICON INC]

## (undated) DEVICE — REM POLYHESIVE ADULT PATIENT RETURN ELECTRODE: Brand: VALLEYLAB

## (undated) DEVICE — SYRINGE CATH TIP 50ML

## (undated) DEVICE — BLADELESS OPTICAL TROCAR WITH FIXATION CANNULA: Brand: VERSAPORT

## (undated) DEVICE — DRAPE,LAP,CHOLE,W/TROUGHS,STERILE: Brand: MEDLINE

## (undated) DEVICE — NEEDLE HYPO 25GA L1.5IN BVL ORIENTED ECLIPSE

## (undated) DEVICE — DEVON™ KNEE AND BODY STRAP 60" X 3" (1.5 M X 7.6 CM): Brand: DEVON